# Patient Record
Sex: MALE
[De-identification: names, ages, dates, MRNs, and addresses within clinical notes are randomized per-mention and may not be internally consistent; named-entity substitution may affect disease eponyms.]

---

## 2020-10-23 ENCOUNTER — APPOINTMENT (OUTPATIENT)
Dept: OTOLARYNGOLOGY | Facility: CLINIC | Age: 60
End: 2020-10-23
Payer: MEDICAID

## 2020-10-23 ENCOUNTER — OUTPATIENT (OUTPATIENT)
Dept: OUTPATIENT SERVICES | Facility: HOSPITAL | Age: 60
LOS: 1 days | Discharge: HOME | End: 2020-10-23

## 2020-10-23 VITALS
HEART RATE: 58 BPM | WEIGHT: 164 LBS | HEIGHT: 70 IN | SYSTOLIC BLOOD PRESSURE: 156 MMHG | RESPIRATION RATE: 12 BRPM | BODY MASS INDEX: 23.48 KG/M2 | DIASTOLIC BLOOD PRESSURE: 89 MMHG | TEMPERATURE: 98.6 F

## 2020-10-23 DIAGNOSIS — F17.200 NICOTINE DEPENDENCE, UNSPECIFIED, UNCOMPLICATED: ICD-10-CM

## 2020-10-23 DIAGNOSIS — H60.8X2 OTHER OTITIS EXTERNA, LEFT EAR: ICD-10-CM

## 2020-10-23 DIAGNOSIS — H92.12 OTORRHEA, LEFT EAR: ICD-10-CM

## 2020-10-23 DIAGNOSIS — H92.02 OTALGIA, LEFT EAR: ICD-10-CM

## 2020-10-23 DIAGNOSIS — H70.12 CHRONIC MASTOIDITIS, LEFT EAR: ICD-10-CM

## 2020-10-23 DIAGNOSIS — H90.6 MIXED CONDUCTIVE AND SENSORINEURAL HEARING LOSS, BILATERAL: ICD-10-CM

## 2020-10-23 DIAGNOSIS — H93.292 OTHER ABNORMAL AUDITORY PERCEPTIONS, LEFT EAR: ICD-10-CM

## 2020-10-23 PROCEDURE — 92557 COMPREHENSIVE HEARING TEST: CPT

## 2020-10-23 PROCEDURE — 69220 CLEAN OUT MASTOID CAVITY: CPT

## 2020-10-23 PROCEDURE — 99204 OFFICE O/P NEW MOD 45 MIN: CPT | Mod: 25

## 2020-10-23 PROCEDURE — 99072 ADDL SUPL MATRL&STAF TM PHE: CPT

## 2020-10-23 PROCEDURE — 92567 TYMPANOMETRY: CPT

## 2020-10-24 NOTE — PHYSICAL EXAM
[Binocular Microscopic Exam] : Binocular microscopic exam was performed [Normal] : no rashes [FreeTextEntry8] : canaloplasty [FreeTextEntry9] : CWD mastoidectomy, debris posteriorly [de-identified] : lateralization of TM [de-identified] : intact

## 2020-10-24 NOTE — PROCEDURE
[] : Debridement of Mastoid [FreeTextEntry3] : Procedure note:  Left mastoid debridement.\par \par Oct 23, 2020 \par \par Description of Procedure:  Mastoid debridement was performed under the operating microscope using otologic instrumentation.  The patient tolerated the procedure without complications.\par \par  [FreeTextEntry6] : infected mastoid bowl

## 2020-10-24 NOTE — HISTORY OF PRESENT ILLNESS
[de-identified] : 59 y/o male referred by Dr. Pleitez for left ear drainage.  Drainage intermittent, present for many years, can be yellowish, associated hearing loss bilaterally.    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  Has had bilateral ear surgery in the past.  Denies perceived changes in his hearing of late, dizziness, or headaches. <<----- Click to add NO pertinent Past Medical History No pertinent past medical history

## 2020-10-24 NOTE — DATA REVIEWED
[de-identified] : I personally reviewed the patient's audiogram, which shows\par AS mod. 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

## 2020-11-12 ENCOUNTER — APPOINTMENT (OUTPATIENT)
Dept: OTOLARYNGOLOGY | Facility: CLINIC | Age: 60
End: 2020-11-12

## 2020-12-09 DIAGNOSIS — F17.200 NICOTINE DEPENDENCE, UNSPECIFIED, UNCOMPLICATED: ICD-10-CM

## 2020-12-09 DIAGNOSIS — H70.12 CHRONIC MASTOIDITIS, LEFT EAR: ICD-10-CM

## 2020-12-09 DIAGNOSIS — H90.6 MIXED CONDUCTIVE AND SENSORINEURAL HEARING LOSS, BILATERAL: ICD-10-CM

## 2020-12-09 DIAGNOSIS — H92.12 OTORRHEA, LEFT EAR: ICD-10-CM

## 2020-12-09 DIAGNOSIS — H93.292 OTHER ABNORMAL AUDITORY PERCEPTIONS, LEFT EAR: ICD-10-CM

## 2020-12-09 DIAGNOSIS — H60.8X2 OTHER OTITIS EXTERNA, LEFT EAR: ICD-10-CM

## 2020-12-09 DIAGNOSIS — H92.02 OTALGIA, LEFT EAR: ICD-10-CM
